# Patient Record
Sex: FEMALE | Race: WHITE | NOT HISPANIC OR LATINO | URBAN - METROPOLITAN AREA
[De-identification: names, ages, dates, MRNs, and addresses within clinical notes are randomized per-mention and may not be internally consistent; named-entity substitution may affect disease eponyms.]

---
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
## 2021-05-31 ENCOUNTER — RECORDS - HEALTHEAST (OUTPATIENT)
Dept: ADMINISTRATIVE | Facility: CLINIC | Age: 21
End: 2021-05-31

## 2021-06-02 ENCOUNTER — RECORDS - HEALTHEAST (OUTPATIENT)
Dept: ADMINISTRATIVE | Facility: CLINIC | Age: 21
End: 2021-06-02

## 2022-12-28 NOTE — LETTER
"      7/7/2017         RE: Mamie Tomlinson  [address]  [place] [address]        Dear Colleague,    Thank you for referring your patient, Mamie Tomlinson, to the [facility]. Please see a copy of my visit note below.       SLEEP STUDY INTERPRETATION  POLYSOMNOGRAPHY REPORT      Patient: Mamie Tomlinson  YOB: 2000  Study Date: 7/7/2017  MRN: [id number]  Referring Provider: Self  Ordering Provider: Ramiro Aranda MD    Indications for Polysomnography: The patient is a 16 y old Female who is 5' 3\" and weighs 242.0 lbs.  Her BMI is 42.9, [place] sleepiness scale 10.0 and neck size is 38.0.  Relevant medical history includes SRIDHAR, MDD, and obesity. A diagnostic polysomnogram was performed to evaluate for LORENA, hypoventilation and hypoxemia.    Polysomnogram Data:  A full night polysomnogram recorded the standard physiologic parameters including EEG, EOG, EMG, ECG, nasal and oral airflow.  Respiratory parameters of chest and abdominal movements were recorded with respiratory inductance plethysmography.  Oxygen saturation was recorded by pulse oximetry.      Sleep Architecture: Decreased sleep latency without sleep aid, normal arousal index, and increased sleep efficiency.  The total recording time of the polysomnogram was 474.7 minutes.  The total sleep time was 465.5 minutes.  Sleep latency was decreased at 2.2 minutes without the use of a sleep aid.  REM latency was 260.5 minutes.  Arousal index was normal at 12.9 arousals per hour.  Sleep efficiency was increased at 98.1%.  Wake after sleep onset was 6.5 minutes.  The patient spent 5.7% of total sleep time in Stage N1, 53.9% in Stage N2, 20.6% in Stages N3, and 19.8% in REM.  Time in REM supine was 30.5 minutes.    Respiration: Severe hypoventilation and tachypnea without significant sleep apnea.  TCM peaked at 61 mmHg (11 mmHg rise).    Events - The polysomnogram revealed a presence of 2 mixed apneas resulting in an apnea index of " ----- Message from [facility] sent at 12/28/2022  9:10 AM EST -----  Subject: Referral Request    Reason for referral request? Cardiologist  Provider patient wants to be referred to(if known):     Provider Phone Number(if known): Additional Information for Provider? Nato Padilla patient's wife called   in to request the patient's provider to submit another referral for a   Cardiologist. She stated the patient's previous Cardiologist moved to an   office on the other side [facility].  Please contact patient to further   assist.   ---------------------------------------------------------------------------  --------------  [facility] INFO    [phone]; OK to leave message on voicemail  ---------------------------------------------------------------------------  -------------- 0.3 events per hour.  There were 8 hypopneas resulting in a hypopnea index of 1.0 event per hour.  The combined apnea/hypopnea index was 1.3 events per hour.  The REM AHI was 3.9 events per hour.  The supine AHI was 0.9 events per hour.  The RERA index was 2.7 events per hour.   The RDI was 4.0 events per hour.    Snoring - was reported as soft.    Respiratory rate and pattern - was notable for tachypnea.    Sustained Sleep Associated Hypoventilation - Transcutaneous carbon dioxide monitoring was used, and significant hypoventilation was present with a maximum change of 11 mmHg (max value of 61 mmHg).    Sleep Associated Hypoxemia - (Greater than 5 minutes O2 sat below 89%) was not present.  Baseline oxygen saturation was 93.8%. Lowest oxygen saturation was 87.0%.  Time spent less than or equal to 88% was 0.1 minutes.  Time spent less than or equal to 89% was 0.3 minutes.  4 2.7 1.3     Movement Activity: Clinically insignificant number of PLMs recorded.    Periodic Limb Activity - There were 4 PLMs during the entire study. The PLM index was 0.5 movements per hour.  The PLM Arousal Index was - per hour.    REM EMG Activity - Excessive muscle activity was not present.    Nocturnal Behavior - Abnormal sleep related behaviors were not noted.    Bruxism - None apparent.    Cardiac Summary: No arrhythmias noted.  The average pulse rate was 59.4 bpm.  The minimum pulse rate was 41.9 bpm while the maximum pulse rate was 90.3 bpm. The rhythm is normal sinus. Arrhythmias were not noted.    Assessment:     Decreased sleep latency without additional sleep aid, normal arousal index, and high sleep efficiency.    Severe hypoventilation during entire study and tachypnea without significant sleep apnea.    o Transcutaneous Carbon Dioxide Monitoring peaked at 61 mmHg (11 mmHg rise from beginning of study, although insufficient baseline wake recording time so overall severity could be worse).   o TCM was >= 55 mmHg for approximately  80% of total sleep time.    Clinically insignificant number of PLMs recorded.     Recommendations:    Clinical evaluation for hypoventilation (although weight is likely the primary cause).    Recommend repeat polysomnography with full night titration of positive airway pressure therapy for the control of sleep disordered breathing.    Advise regarding the risks of drowsy driving.    Suggest optimizing sleep schedule and avoiding sleep deprivation.    Weight management (if BMI > 30).    Pharmacologic therapy should be used for management of restless legs syndrome only if present and clinically indicated and not based on the presence of periodic limb movements alone.    Cardiac Comments: -  Diagnostic Codes:     Sleep Hypoxemia/Hypoventilation G47.36      _____________________________________   Electronically Signed By: Ramiro Aranda MD 7/10/2017         Again, thank you for allowing me to participate in the care of your patient.      Sincerely,    Ramiro Aranda MD